# Patient Record
Sex: MALE | Race: OTHER | HISPANIC OR LATINO | Employment: PART TIME | URBAN - METROPOLITAN AREA
[De-identification: names, ages, dates, MRNs, and addresses within clinical notes are randomized per-mention and may not be internally consistent; named-entity substitution may affect disease eponyms.]

---

## 2023-11-26 ENCOUNTER — APPOINTMENT (OUTPATIENT)
Dept: RADIOLOGY | Facility: HOSPITAL | Age: 33
End: 2023-11-26

## 2023-11-26 ENCOUNTER — HOSPITAL ENCOUNTER (EMERGENCY)
Facility: HOSPITAL | Age: 33
Discharge: HOME | End: 2023-11-27
Attending: STUDENT IN AN ORGANIZED HEALTH CARE EDUCATION/TRAINING PROGRAM

## 2023-11-26 VITALS
WEIGHT: 235 LBS | RESPIRATION RATE: 18 BRPM | BODY MASS INDEX: 34.8 KG/M2 | OXYGEN SATURATION: 99 % | SYSTOLIC BLOOD PRESSURE: 132 MMHG | HEART RATE: 74 BPM | HEIGHT: 69 IN | DIASTOLIC BLOOD PRESSURE: 73 MMHG | TEMPERATURE: 97.2 F

## 2023-11-26 DIAGNOSIS — R07.81 RIB PAIN: Primary | ICD-10-CM

## 2023-11-26 LAB
BASOPHILS # BLD AUTO: 0.08 X10*3/UL (ref 0–0.1)
BASOPHILS NFR BLD AUTO: 0.8 %
EOSINOPHIL # BLD AUTO: 0.11 X10*3/UL (ref 0–0.7)
EOSINOPHIL NFR BLD AUTO: 1.1 %
ERYTHROCYTE [DISTWIDTH] IN BLOOD BY AUTOMATED COUNT: 12.6 % (ref 11.5–14.5)
HCT VFR BLD AUTO: 47.9 % (ref 41–52)
HGB BLD-MCNC: 16.7 G/DL (ref 13.5–17.5)
IMM GRANULOCYTES # BLD AUTO: 0.04 X10*3/UL (ref 0–0.7)
IMM GRANULOCYTES NFR BLD AUTO: 0.4 % (ref 0–0.9)
LYMPHOCYTES # BLD AUTO: 2.75 X10*3/UL (ref 1.2–4.8)
LYMPHOCYTES NFR BLD AUTO: 27.1 %
MCH RBC QN AUTO: 30.8 PG (ref 26–34)
MCHC RBC AUTO-ENTMCNC: 34.9 G/DL (ref 32–36)
MCV RBC AUTO: 88 FL (ref 80–100)
MONOCYTES # BLD AUTO: 0.85 X10*3/UL (ref 0.1–1)
MONOCYTES NFR BLD AUTO: 8.4 %
NEUTROPHILS # BLD AUTO: 6.33 X10*3/UL (ref 1.2–7.7)
NEUTROPHILS NFR BLD AUTO: 62.2 %
NRBC BLD-RTO: 0 /100 WBCS (ref 0–0)
PLATELET # BLD AUTO: 306 X10*3/UL (ref 150–450)
RBC # BLD AUTO: 5.42 X10*6/UL (ref 4.5–5.9)
WBC # BLD AUTO: 10.2 X10*3/UL (ref 4.4–11.3)

## 2023-11-26 PROCEDURE — 99284 EMERGENCY DEPT VISIT MOD MDM: CPT | Performed by: STUDENT IN AN ORGANIZED HEALTH CARE EDUCATION/TRAINING PROGRAM

## 2023-11-26 PROCEDURE — 85025 COMPLETE CBC W/AUTO DIFF WBC: CPT

## 2023-11-26 PROCEDURE — 71046 X-RAY EXAM CHEST 2 VIEWS: CPT | Performed by: RADIOLOGY

## 2023-11-26 PROCEDURE — 99283 EMERGENCY DEPT VISIT LOW MDM: CPT | Mod: 25

## 2023-11-26 PROCEDURE — 84484 ASSAY OF TROPONIN QUANT: CPT

## 2023-11-26 PROCEDURE — 71046 X-RAY EXAM CHEST 2 VIEWS: CPT

## 2023-11-26 PROCEDURE — 80053 COMPREHEN METABOLIC PANEL: CPT

## 2023-11-26 PROCEDURE — 36415 COLL VENOUS BLD VENIPUNCTURE: CPT

## 2023-11-26 RX ORDER — ACETAMINOPHEN 325 MG/1
650 TABLET ORAL ONCE
Status: COMPLETED | OUTPATIENT
Start: 2023-11-26 | End: 2023-11-26

## 2023-11-26 RX ADMIN — ACETAMINOPHEN 650 MG: 325 TABLET ORAL at 23:01

## 2023-11-26 ASSESSMENT — PAIN DESCRIPTION - ORIENTATION
ORIENTATION: RIGHT
ORIENTATION: RIGHT

## 2023-11-26 ASSESSMENT — COLUMBIA-SUICIDE SEVERITY RATING SCALE - C-SSRS
2. HAVE YOU ACTUALLY HAD ANY THOUGHTS OF KILLING YOURSELF?: NO
1. IN THE PAST MONTH, HAVE YOU WISHED YOU WERE DEAD OR WISHED YOU COULD GO TO SLEEP AND NOT WAKE UP?: NO
6. HAVE YOU EVER DONE ANYTHING, STARTED TO DO ANYTHING, OR PREPARED TO DO ANYTHING TO END YOUR LIFE?: NO

## 2023-11-26 ASSESSMENT — PAIN DESCRIPTION - LOCATION
LOCATION: RIB CAGE
LOCATION: RIB CAGE

## 2023-11-26 ASSESSMENT — LIFESTYLE VARIABLES
EVER HAD A DRINK FIRST THING IN THE MORNING TO STEADY YOUR NERVES TO GET RID OF A HANGOVER: NO
HAVE YOU EVER FELT YOU SHOULD CUT DOWN ON YOUR DRINKING: NO
REASON UNABLE TO ASSESS: NO
HAVE PEOPLE ANNOYED YOU BY CRITICIZING YOUR DRINKING: NO
EVER FELT BAD OR GUILTY ABOUT YOUR DRINKING: NO

## 2023-11-26 ASSESSMENT — PAIN - FUNCTIONAL ASSESSMENT
PAIN_FUNCTIONAL_ASSESSMENT: 0-10
PAIN_FUNCTIONAL_ASSESSMENT: 0-10

## 2023-11-26 ASSESSMENT — PAIN DESCRIPTION - PROGRESSION: CLINICAL_PROGRESSION: NOT CHANGED

## 2023-11-26 ASSESSMENT — PAIN SCALES - GENERAL
PAINLEVEL_OUTOF10: 3
PAINLEVEL_OUTOF10: 3

## 2023-11-26 ASSESSMENT — HEART SCORE
AGE: <45
RISK FACTORS: NO KNOWN RISK FACTORS

## 2023-11-26 ASSESSMENT — PAIN DESCRIPTION - PAIN TYPE: TYPE: ACUTE PAIN

## 2023-11-26 ASSESSMENT — PAIN DESCRIPTION - ONSET: ONSET: GRADUAL

## 2023-11-26 ASSESSMENT — PAIN DESCRIPTION - DESCRIPTORS: DESCRIPTORS: SHARP

## 2023-11-27 LAB
ALBUMIN SERPL BCP-MCNC: 4.7 G/DL (ref 3.4–5)
ALP SERPL-CCNC: 78 U/L (ref 33–120)
ALT SERPL W P-5'-P-CCNC: 50 U/L (ref 10–52)
ANION GAP SERPL CALC-SCNC: 11 MMOL/L (ref 10–20)
AST SERPL W P-5'-P-CCNC: 28 U/L (ref 9–39)
BILIRUB SERPL-MCNC: 0.6 MG/DL (ref 0–1.2)
BUN SERPL-MCNC: 15 MG/DL (ref 6–23)
CALCIUM SERPL-MCNC: 9.3 MG/DL (ref 8.6–10.3)
CARDIAC TROPONIN I PNL SERPL HS: 3 NG/L (ref 0–20)
CHLORIDE SERPL-SCNC: 106 MMOL/L (ref 98–107)
CO2 SERPL-SCNC: 25 MMOL/L (ref 21–32)
CREAT SERPL-MCNC: 1.09 MG/DL (ref 0.5–1.3)
GFR SERPL CREATININE-BSD FRML MDRD: >90 ML/MIN/1.73M*2
GLUCOSE SERPL-MCNC: 92 MG/DL (ref 74–99)
POTASSIUM SERPL-SCNC: 3.6 MMOL/L (ref 3.5–5.3)
PROT SERPL-MCNC: 7.9 G/DL (ref 6.4–8.2)
SODIUM SERPL-SCNC: 138 MMOL/L (ref 136–145)

## 2023-11-27 RX ORDER — LIDOCAINE 50 MG/G
1 PATCH TOPICAL DAILY
Qty: 5 PATCH | Refills: 0 | Status: SHIPPED | OUTPATIENT
Start: 2023-11-27

## 2023-11-27 NOTE — ED PROVIDER NOTES
HPI   Chief Complaint   Patient presents with    Rib Injury     Pt has had right sided rib pain x 2 weeks, pain increases when he coughs, area feels swollen to him. Denies injury/trauma to area.        Yohannes is a 33-year-old male with a PMHx of tobacco use presenting to the emergency department with left sided rib pain x 2 weeks.  Today, he rates the pain a 3 out of 10.  He took an Advil yesterday with some improvement in the pain. It does not radiate and is worse with coughing.  Denies hemoptysis.  Yohannes does not recall any injury to the area of pain.  He works as a  and sometimes lifts heavy boxes. Denies chest pain, shortness of breath, fever, chills, nausea, vomiting, abdominal pain, urinary symptoms, recent travel, history of cancer, and coagulation disorders.                           [place] Coma Scale Score: 15                  Patient History   No past medical history on file.  No past surgical history on file.  No family history on file.  Social History     Tobacco Use    Smoking status: Not on file    Smokeless tobacco: Not on file   Substance Use Topics    Alcohol use: Not on file    Drug use: Not on file       Physical Exam   ED Triage Vitals   Temp Heart Rate Resp BP   11/26/23 2215 11/26/23 2218 11/26/23 2218 11/26/23 2218   36.2 °C (97.2 °F) 74 18 132/73      SpO2 Temp src Heart Rate Source Patient Position   11/26/23 2218 -- -- --   99 %         BP Location FiO2 (%)     -- --             Physical Exam  Constitutional:       Appearance: Normal appearance.   Cardiovascular:      Rate and Rhythm: Normal rate and regular rhythm.      Pulses: Normal pulses.      Heart sounds: Normal heart sounds.   Pulmonary:      Effort: Pulmonary effort is normal. No respiratory distress.      Breath sounds: Normal breath sounds. No stridor. No wheezing or rhonchi.   Abdominal:      General: Abdomen is flat. There is no distension.      Palpations: Abdomen is soft.      Tenderness: There is no abdominal tenderness.  There is no right CVA tenderness, left CVA tenderness, guarding or rebound.   Musculoskeletal:         General: No swelling. Normal range of motion.      Cervical back: Normal range of motion.      Comments: Tenderness to palpation over left lateral distal ribs.    Skin:     General: Skin is warm and dry.   Neurological:      Mental Status: He is alert and oriented to person, place, and time.   Psychiatric:         Mood and Affect: Mood normal.         Behavior: Behavior normal.         ED Course & [facility]   ED Course as of 11/27/23 0012   Sun Nov 26, 2023   2240 HEART Score:    History:   X Slightly suspicious - 0   [  ] Moderately suspicious - 1  [  ] Highly suspicious - 2     EKG:   [ ] Normal - 0    [ X] Non-specific repolarization disturbance (No ST changes, but LBBB, LVH, repolarization changes)  - 1   [  ] Significant ST deviation (ST changes not d/t LBBB, LVH, digoxin)  - 2     Age:   [ X] < 45 - 0   [  ] 45-64 - 1   [  ] > 65 - 2     Risk Factors:     HTN, HLD, DM, obesity (BMI > 30), smoking (current or w/I 3mo), + fmx (before age of 65), CAD, prior MI, PCI/CABG, CVA/TIA, PAD  [  ] No known risk factors - 0   X 1-2 risk factors - 1    [  ] >3 risk factors or hx of atherosclerotic disease - 2     Initial troponin:  X < normal limit - 0   [  ] 1 - 3x normal limit - 1   [  ] > 3x normal limit - 2    Total:   X 0-3 Points: 1.7% risk of major adverse cardiac event in 6 weeks  [ ] 4-6 Points: 12-16.6% risk of major adverse cardiac event in 6 weeks  [ ] 7-10 Points 50-65% risk of major adverse cardiac event in 6 weeks    I did discuss the heart score results with the patient.  We did discuss the risk stratification. I did discuss that the patient's risk based on the above scoring and their risk of coronary artery disease. The patient is comfortable being discharged home and following up with the appropriate physicians. This is shared decision making. They're to return to the nearest emergency room for any new or  worsening symptoms. [MG]   2246 Interpreted by the Emergency Department Attending: ECG revealed normal sinus rhythm at a rate of 68 beats per minute with NE interval 142 , QRS of 90 , QTc of 391.  No acute injury pattern. [MG]      ED Course User Index  [MG] Ye DO Tanya         Diagnoses as of 11/27/23 0012   Rib pain       Medical Decision Making  Ray is a 33-year-old male with a PMHx of tobacco use presenting to the emergency department with left rib pain x 2 weeks that is worse with coughing.  Mild improvement in pain with Advil.  No cardiac history.  CBC, CMP, troponin, EKG, and CXR were ordered.  Patient rated his pain a 3 out of 10.  Tylenol was given for pain control.  Differentials include musculoskeletal injury, ACS, pulmonary embolism, costochondritis, and viral illness.      Diagnostic work-up did not reveal any abnormalities.  CBC, CMP, troponin, EKG, and chest x-ray all within normal limits.  PERC score is 0 indicating less than 2% risk of pulmonary embolism.  Heart score is 0-3 indicating 1.7% risk of major adverse cardiac event 6 weeks. The rib pain is likely musculoskeletal in nature.  I recommended continuation of Advil and Tylenol at home as needed for pain. He may also use lidocaine patches over area of pain.  Recommended using patch when at work and when patient is exerting himself. Patch may be worn for 12 hours or less. Addressed all questions and concerns.  Patient demonstrated understanding.    Dx: Rib Pain        Procedure  Procedures     Imelda Huerta PA-C  11/27/23 0026

## 2023-12-22 ENCOUNTER — HOSPITAL ENCOUNTER (OUTPATIENT)
Dept: CARDIOLOGY | Facility: HOSPITAL | Age: 33
Discharge: HOME | End: 2023-12-22

## 2023-12-22 PROCEDURE — 93005 ELECTROCARDIOGRAM TRACING: CPT

## 2024-01-01 LAB
ATRIAL RATE: 68 BPM
P AXIS: 30 DEGREES
P OFFSET: 215 MS
P ONSET: 155 MS
PR INTERVAL: 142 MS
Q ONSET: 226 MS
QRS COUNT: 11 BEATS
QRS DURATION: 90 MS
QT INTERVAL: 368 MS
QTC CALCULATION(BAZETT): 391 MS
QTC FREDERICIA: 383 MS
R AXIS: 73 DEGREES
T AXIS: 27 DEGREES
T OFFSET: 410 MS
VENTRICULAR RATE: 68 BPM